# Patient Record
Sex: MALE | Race: BLACK OR AFRICAN AMERICAN | NOT HISPANIC OR LATINO | Employment: OTHER | URBAN - METROPOLITAN AREA
[De-identification: names, ages, dates, MRNs, and addresses within clinical notes are randomized per-mention and may not be internally consistent; named-entity substitution may affect disease eponyms.]

---

## 2024-05-16 ENCOUNTER — APPOINTMENT (OUTPATIENT)
Dept: RADIOLOGY | Facility: HOSPITAL | Age: 74
End: 2024-05-16
Payer: MEDICARE

## 2024-05-16 ENCOUNTER — HOSPITAL ENCOUNTER (OUTPATIENT)
Facility: HOSPITAL | Age: 74
Setting detail: OBSERVATION
Discharge: HOME | End: 2024-05-17
Attending: EMERGENCY MEDICINE | Admitting: INTERNAL MEDICINE
Payer: MEDICARE

## 2024-05-16 ENCOUNTER — APPOINTMENT (OUTPATIENT)
Dept: CARDIOLOGY | Facility: HOSPITAL | Age: 74
End: 2024-05-16
Payer: MEDICARE

## 2024-05-16 DIAGNOSIS — R55 SYNCOPE AND COLLAPSE: Primary | ICD-10-CM

## 2024-05-16 DIAGNOSIS — R55 SYNCOPE, UNSPECIFIED SYNCOPE TYPE: ICD-10-CM

## 2024-05-16 DIAGNOSIS — I10 HYPERTENSION, UNSPECIFIED TYPE: ICD-10-CM

## 2024-05-16 LAB
ALBUMIN SERPL-MCNC: 3.5 G/DL (ref 3.5–5)
ALP BLD-CCNC: 79 U/L (ref 35–125)
ALT SERPL-CCNC: 18 U/L (ref 5–40)
AMPHETAMINES UR QL SCN>1000 NG/ML: NEGATIVE
ANION GAP SERPL CALC-SCNC: 10 MMOL/L
APPEARANCE UR: CLEAR
AST SERPL-CCNC: 27 U/L (ref 5–40)
ATRIAL RATE: 71 BPM
BARBITURATES UR QL SCN>300 NG/ML: NEGATIVE
BASOPHILS # BLD AUTO: 0.03 X10*3/UL (ref 0–0.1)
BASOPHILS NFR BLD AUTO: 0.8 %
BENZODIAZ UR QL SCN>300 NG/ML: NEGATIVE
BILIRUB SERPL-MCNC: 0.3 MG/DL (ref 0.1–1.2)
BILIRUB UR STRIP.AUTO-MCNC: NEGATIVE MG/DL
BUN SERPL-MCNC: 11 MG/DL (ref 8–25)
BZE UR QL SCN>300 NG/ML: NEGATIVE
CALCIUM SERPL-MCNC: 9.2 MG/DL (ref 8.5–10.4)
CANNABINOIDS UR QL SCN>50 NG/ML: POSITIVE
CHLORIDE SERPL-SCNC: 100 MMOL/L (ref 97–107)
CO2 SERPL-SCNC: 27 MMOL/L (ref 24–31)
COLOR UR: COLORLESS
CREAT SERPL-MCNC: 0.9 MG/DL (ref 0.4–1.6)
EGFRCR SERPLBLD CKD-EPI 2021: 90 ML/MIN/1.73M*2
EOSINOPHIL # BLD AUTO: 0.08 X10*3/UL (ref 0–0.4)
EOSINOPHIL NFR BLD AUTO: 2.1 %
ERYTHROCYTE [DISTWIDTH] IN BLOOD BY AUTOMATED COUNT: 15.8 % (ref 11.5–14.5)
ETHANOL SERPL-MCNC: 0.09 G/DL
FENTANYL+NORFENTANYL UR QL SCN: NEGATIVE
GLUCOSE SERPL-MCNC: 139 MG/DL (ref 65–99)
GLUCOSE UR STRIP.AUTO-MCNC: NORMAL MG/DL
HCT VFR BLD AUTO: 36.7 % (ref 41–52)
HGB BLD-MCNC: 11.6 G/DL (ref 13.5–17.5)
HOLD SPECIMEN: NORMAL
IMM GRANULOCYTES # BLD AUTO: 0.04 X10*3/UL (ref 0–0.5)
IMM GRANULOCYTES NFR BLD AUTO: 1 % (ref 0–0.9)
KETONES UR STRIP.AUTO-MCNC: NEGATIVE MG/DL
LACTATE BLDV-SCNC: 2.1 MMOL/L (ref 0.4–2)
LACTATE BLDV-SCNC: 2.2 MMOL/L (ref 0.4–2)
LEUKOCYTE ESTERASE UR QL STRIP.AUTO: NEGATIVE
LYMPHOCYTES # BLD AUTO: 1.04 X10*3/UL (ref 0.8–3)
LYMPHOCYTES NFR BLD AUTO: 26.7 %
MCH RBC QN AUTO: 30.9 PG (ref 26–34)
MCHC RBC AUTO-ENTMCNC: 31.6 G/DL (ref 32–36)
MCV RBC AUTO: 98 FL (ref 80–100)
METHADONE UR QL SCN>300 NG/ML: NEGATIVE
MONOCYTES # BLD AUTO: 0.42 X10*3/UL (ref 0.05–0.8)
MONOCYTES NFR BLD AUTO: 10.8 %
NEUTROPHILS # BLD AUTO: 2.28 X10*3/UL (ref 1.6–5.5)
NEUTROPHILS NFR BLD AUTO: 58.6 %
NITRITE UR QL STRIP.AUTO: NEGATIVE
NRBC BLD-RTO: 0 /100 WBCS (ref 0–0)
NT-PROBNP SERPL-MCNC: 160 PG/ML (ref 0–229)
OPIATES UR QL SCN>300 NG/ML: NEGATIVE
OXYCODONE UR QL: NEGATIVE
P AXIS: 55 DEGREES
P OFFSET: 158 MS
P ONSET: 88 MS
PCP UR QL SCN>25 NG/ML: NEGATIVE
PH UR STRIP.AUTO: 6.5 [PH]
PLATELET # BLD AUTO: 202 X10*3/UL (ref 150–450)
POTASSIUM SERPL-SCNC: 3.4 MMOL/L (ref 3.4–5.1)
PR INTERVAL: 276 MS
PROT SERPL-MCNC: 6.5 G/DL (ref 5.9–7.9)
PROT UR STRIP.AUTO-MCNC: NEGATIVE MG/DL
Q ONSET: 226 MS
QRS COUNT: 11 BEATS
QRS DURATION: 154 MS
QT INTERVAL: 432 MS
QTC CALCULATION(BAZETT): 469 MS
QTC FREDERICIA: 457 MS
R AXIS: 265 DEGREES
RBC # BLD AUTO: 3.75 X10*6/UL (ref 4.5–5.9)
RBC # UR STRIP.AUTO: NEGATIVE /UL
SODIUM SERPL-SCNC: 137 MMOL/L (ref 133–145)
SP GR UR STRIP.AUTO: 1
T AXIS: 46 DEGREES
T OFFSET: 442 MS
TROPONIN T SERPL-MCNC: 26 NG/L
TROPONIN T SERPL-MCNC: 30 NG/L
UROBILINOGEN UR STRIP.AUTO-MCNC: NORMAL MG/DL
VENTRICULAR RATE: 71 BPM
WBC # BLD AUTO: 3.9 X10*3/UL (ref 4.4–11.3)

## 2024-05-16 PROCEDURE — 71045 X-RAY EXAM CHEST 1 VIEW: CPT | Performed by: RADIOLOGY

## 2024-05-16 PROCEDURE — 70450 CT HEAD/BRAIN W/O DYE: CPT

## 2024-05-16 PROCEDURE — 93005 ELECTROCARDIOGRAM TRACING: CPT

## 2024-05-16 PROCEDURE — 72125 CT NECK SPINE W/O DYE: CPT | Performed by: RADIOLOGY

## 2024-05-16 PROCEDURE — G0378 HOSPITAL OBSERVATION PER HR: HCPCS

## 2024-05-16 PROCEDURE — 80307 DRUG TEST PRSMV CHEM ANLYZR: CPT | Performed by: EMERGENCY MEDICINE

## 2024-05-16 PROCEDURE — 71045 X-RAY EXAM CHEST 1 VIEW: CPT

## 2024-05-16 PROCEDURE — 82077 ASSAY SPEC XCP UR&BREATH IA: CPT | Performed by: EMERGENCY MEDICINE

## 2024-05-16 PROCEDURE — 2500000004 HC RX 250 GENERAL PHARMACY W/ HCPCS (ALT 636 FOR OP/ED): Performed by: EMERGENCY MEDICINE

## 2024-05-16 PROCEDURE — 2500000001 HC RX 250 WO HCPCS SELF ADMINISTERED DRUGS (ALT 637 FOR MEDICARE OP): Performed by: EMERGENCY MEDICINE

## 2024-05-16 PROCEDURE — 99223 1ST HOSP IP/OBS HIGH 75: CPT

## 2024-05-16 PROCEDURE — 84484 ASSAY OF TROPONIN QUANT: CPT | Performed by: EMERGENCY MEDICINE

## 2024-05-16 PROCEDURE — 72125 CT NECK SPINE W/O DYE: CPT

## 2024-05-16 PROCEDURE — 83605 ASSAY OF LACTIC ACID: CPT | Mod: 91 | Performed by: EMERGENCY MEDICINE

## 2024-05-16 PROCEDURE — 81003 URINALYSIS AUTO W/O SCOPE: CPT | Mod: 59 | Performed by: EMERGENCY MEDICINE

## 2024-05-16 PROCEDURE — 70450 CT HEAD/BRAIN W/O DYE: CPT | Performed by: RADIOLOGY

## 2024-05-16 PROCEDURE — 80053 COMPREHEN METABOLIC PANEL: CPT | Performed by: EMERGENCY MEDICINE

## 2024-05-16 PROCEDURE — 96360 HYDRATION IV INFUSION INIT: CPT | Mod: 59

## 2024-05-16 PROCEDURE — 36415 COLL VENOUS BLD VENIPUNCTURE: CPT | Performed by: EMERGENCY MEDICINE

## 2024-05-16 PROCEDURE — 2500000001 HC RX 250 WO HCPCS SELF ADMINISTERED DRUGS (ALT 637 FOR MEDICARE OP)

## 2024-05-16 PROCEDURE — 99285 EMERGENCY DEPT VISIT HI MDM: CPT | Mod: 25

## 2024-05-16 PROCEDURE — 83880 ASSAY OF NATRIURETIC PEPTIDE: CPT | Performed by: EMERGENCY MEDICINE

## 2024-05-16 PROCEDURE — 85025 COMPLETE CBC W/AUTO DIFF WBC: CPT | Performed by: EMERGENCY MEDICINE

## 2024-05-16 RX ORDER — GABAPENTIN 300 MG/1
300 CAPSULE ORAL 2 TIMES DAILY
COMMUNITY
Start: 2024-05-14 | End: 2024-06-13

## 2024-05-16 RX ORDER — ONDANSETRON HYDROCHLORIDE 2 MG/ML
4 INJECTION, SOLUTION INTRAVENOUS EVERY 8 HOURS PRN
Status: DISCONTINUED | OUTPATIENT
Start: 2024-05-16 | End: 2024-05-17 | Stop reason: HOSPADM

## 2024-05-16 RX ORDER — NAPROXEN SODIUM 220 MG/1
81 TABLET, FILM COATED ORAL DAILY
Status: DISCONTINUED | OUTPATIENT
Start: 2024-05-16 | End: 2024-05-17 | Stop reason: HOSPADM

## 2024-05-16 RX ORDER — CARVEDILOL 6.25 MG/1
6.25 TABLET ORAL
COMMUNITY

## 2024-05-16 RX ORDER — ATORVASTATIN CALCIUM 40 MG/1
40 TABLET, FILM COATED ORAL NIGHTLY
COMMUNITY
Start: 2024-05-14

## 2024-05-16 RX ORDER — ACETAMINOPHEN 650 MG/1
650 SUPPOSITORY RECTAL EVERY 4 HOURS PRN
Status: DISCONTINUED | OUTPATIENT
Start: 2024-05-16 | End: 2024-05-17 | Stop reason: HOSPADM

## 2024-05-16 RX ORDER — CALCIUM CARBONATE 500(1250)
1250 TABLET ORAL DAILY
Status: DISCONTINUED | OUTPATIENT
Start: 2024-05-16 | End: 2024-05-17 | Stop reason: HOSPADM

## 2024-05-16 RX ORDER — ONDANSETRON 4 MG/1
4 TABLET, FILM COATED ORAL EVERY 8 HOURS PRN
Status: DISCONTINUED | OUTPATIENT
Start: 2024-05-16 | End: 2024-05-17 | Stop reason: HOSPADM

## 2024-05-16 RX ORDER — MULTIVITAMIN
1 TABLET ORAL DAILY
COMMUNITY

## 2024-05-16 RX ORDER — GABAPENTIN 300 MG/1
300 CAPSULE ORAL 2 TIMES DAILY
Status: DISCONTINUED | OUTPATIENT
Start: 2024-05-16 | End: 2024-05-17 | Stop reason: HOSPADM

## 2024-05-16 RX ORDER — CARVEDILOL 6.25 MG/1
6.25 TABLET ORAL
Status: DISCONTINUED | OUTPATIENT
Start: 2024-05-16 | End: 2024-05-17 | Stop reason: HOSPADM

## 2024-05-16 RX ORDER — LANOLIN ALCOHOL/MO/W.PET/CERES
100 CREAM (GRAM) TOPICAL 3 TIMES DAILY
COMMUNITY
Start: 2024-05-14 | End: 2024-06-13

## 2024-05-16 RX ORDER — BISMUTH SUBSALICYLATE 262 MG
1 TABLET,CHEWABLE ORAL DAILY
Status: DISCONTINUED | OUTPATIENT
Start: 2024-05-16 | End: 2024-05-17 | Stop reason: HOSPADM

## 2024-05-16 RX ORDER — LANOLIN ALCOHOL/MO/W.PET/CERES
100 CREAM (GRAM) TOPICAL
Status: DISCONTINUED | OUTPATIENT
Start: 2024-05-16 | End: 2024-05-17 | Stop reason: HOSPADM

## 2024-05-16 RX ORDER — ACETAMINOPHEN 325 MG/1
650 TABLET ORAL EVERY 4 HOURS PRN
Status: DISCONTINUED | OUTPATIENT
Start: 2024-05-16 | End: 2024-05-17 | Stop reason: HOSPADM

## 2024-05-16 RX ORDER — CHOLECALCIFEROL (VITAMIN D3) 50 MCG
2000 TABLET ORAL DAILY
COMMUNITY
Start: 2022-12-14

## 2024-05-16 RX ORDER — ATORVASTATIN CALCIUM 40 MG/1
40 TABLET, FILM COATED ORAL NIGHTLY
Status: DISCONTINUED | OUTPATIENT
Start: 2024-05-16 | End: 2024-05-17 | Stop reason: HOSPADM

## 2024-05-16 RX ORDER — TAMSULOSIN HYDROCHLORIDE 0.4 MG/1
0.4 CAPSULE ORAL DAILY
COMMUNITY

## 2024-05-16 RX ORDER — POLYETHYLENE GLYCOL 3350 17 G/17G
17 POWDER, FOR SOLUTION ORAL DAILY PRN
Status: DISCONTINUED | OUTPATIENT
Start: 2024-05-16 | End: 2024-05-17 | Stop reason: HOSPADM

## 2024-05-16 RX ORDER — CALCIUM CARBONATE 500(1250)
1 TABLET ORAL DAILY
COMMUNITY
Start: 2022-12-14

## 2024-05-16 RX ORDER — ACETAMINOPHEN 160 MG/5ML
650 SOLUTION ORAL EVERY 4 HOURS PRN
Status: DISCONTINUED | OUTPATIENT
Start: 2024-05-16 | End: 2024-05-17 | Stop reason: HOSPADM

## 2024-05-16 RX ADMIN — CARVEDILOL 6.25 MG: 6.25 TABLET, FILM COATED ORAL at 21:09

## 2024-05-16 RX ADMIN — GABAPENTIN 300 MG: 300 CAPSULE ORAL at 21:09

## 2024-05-16 RX ADMIN — SODIUM CHLORIDE 1000 ML: 9 INJECTION, SOLUTION INTRAVENOUS at 09:30

## 2024-05-16 RX ADMIN — APIXABAN 2.5 MG: 2.5 TABLET, FILM COATED ORAL at 22:31

## 2024-05-16 RX ADMIN — Medication 100 MG: at 21:09

## 2024-05-16 RX ADMIN — MULTIVITAMIN TABLET 1 TABLET: TABLET at 21:09

## 2024-05-16 RX ADMIN — ATORVASTATIN CALCIUM 40 MG: 40 TABLET, FILM COATED ORAL at 21:09

## 2024-05-16 RX ADMIN — CALCIUM 1250 MG: 500 TABLET ORAL at 22:31

## 2024-05-16 SDOH — SOCIAL STABILITY: SOCIAL INSECURITY: ABUSE: ADULT

## 2024-05-16 SDOH — SOCIAL STABILITY: SOCIAL INSECURITY: HAVE YOU HAD THOUGHTS OF HARMING ANYONE ELSE?: NO

## 2024-05-16 SDOH — SOCIAL STABILITY: SOCIAL INSECURITY: DOES ANYONE TRY TO KEEP YOU FROM HAVING/CONTACTING OTHER FRIENDS OR DOING THINGS OUTSIDE YOUR HOME?: NO

## 2024-05-16 SDOH — SOCIAL STABILITY: SOCIAL INSECURITY: HAS ANYONE EVER THREATENED TO HURT YOUR FAMILY OR YOUR PETS?: NO

## 2024-05-16 SDOH — SOCIAL STABILITY: SOCIAL INSECURITY: DO YOU FEEL ANYONE HAS EXPLOITED OR TAKEN ADVANTAGE OF YOU FINANCIALLY OR OF YOUR PERSONAL PROPERTY?: NO

## 2024-05-16 SDOH — SOCIAL STABILITY: SOCIAL INSECURITY: ARE THERE ANY APPARENT SIGNS OF INJURIES/BEHAVIORS THAT COULD BE RELATED TO ABUSE/NEGLECT?: NO

## 2024-05-16 SDOH — SOCIAL STABILITY: SOCIAL INSECURITY: WERE YOU ABLE TO COMPLETE ALL THE BEHAVIORAL HEALTH SCREENINGS?: YES

## 2024-05-16 SDOH — SOCIAL STABILITY: SOCIAL INSECURITY: ARE YOU OR HAVE YOU BEEN THREATENED OR ABUSED PHYSICALLY, EMOTIONALLY, OR SEXUALLY BY ANYONE?: NO

## 2024-05-16 SDOH — SOCIAL STABILITY: SOCIAL INSECURITY: HAVE YOU HAD ANY THOUGHTS OF HARMING ANYONE ELSE?: NO

## 2024-05-16 SDOH — SOCIAL STABILITY: SOCIAL INSECURITY: DO YOU FEEL UNSAFE GOING BACK TO THE PLACE WHERE YOU ARE LIVING?: NO

## 2024-05-16 ASSESSMENT — ACTIVITIES OF DAILY LIVING (ADL)
LACK_OF_TRANSPORTATION: NO
TOILETING: INDEPENDENT
GROOMING: INDEPENDENT
DRESSING YOURSELF: INDEPENDENT
JUDGMENT_ADEQUATE_SAFELY_COMPLETE_DAILY_ACTIVITIES: YES
FEEDING YOURSELF: INDEPENDENT
ADEQUATE_TO_COMPLETE_ADL: YES
BATHING: INDEPENDENT
WALKS IN HOME: INDEPENDENT
ASSISTIVE_DEVICE: DENTURES LOWER;EYEGLASSES
PATIENT'S MEMORY ADEQUATE TO SAFELY COMPLETE DAILY ACTIVITIES?: YES
HEARING - LEFT EAR: FUNCTIONAL
HEARING - RIGHT EAR: FUNCTIONAL

## 2024-05-16 ASSESSMENT — COGNITIVE AND FUNCTIONAL STATUS - GENERAL
MOBILITY SCORE: 24
PATIENT BASELINE BEDBOUND: NO
DAILY ACTIVITIY SCORE: 24

## 2024-05-16 ASSESSMENT — LIFESTYLE VARIABLES
AUDIT-C TOTAL SCORE: 2
HAVE YOU EVER FELT YOU SHOULD CUT DOWN ON YOUR DRINKING: NO
HOW OFTEN DO YOU HAVE A DRINK CONTAINING ALCOHOL: 2-4 TIMES A MONTH
HOW OFTEN DO YOU HAVE 6 OR MORE DRINKS ON ONE OCCASION: NEVER
AUDIT-C TOTAL SCORE: 2
SUBSTANCE_ABUSE_PAST_12_MONTHS: YES
PRESCIPTION_ABUSE_PAST_12_MONTHS: NO
HAVE PEOPLE ANNOYED YOU BY CRITICIZING YOUR DRINKING: NO
HOW MANY STANDARD DRINKS CONTAINING ALCOHOL DO YOU HAVE ON A TYPICAL DAY: 1 OR 2
EVER FELT BAD OR GUILTY ABOUT YOUR DRINKING: NO
SKIP TO QUESTIONS 9-10: 1
EVER HAD A DRINK FIRST THING IN THE MORNING TO STEADY YOUR NERVES TO GET RID OF A HANGOVER: NO
TOTAL SCORE: 0

## 2024-05-16 ASSESSMENT — ENCOUNTER SYMPTOMS
PALPITATIONS: 0
CHILLS: 0
ACTIVITY CHANGE: 1
DIFFICULTY URINATING: 0
LIGHT-HEADEDNESS: 1
DIAPHORESIS: 0
ABDOMINAL DISTENTION: 0
HEADACHES: 1
NECK PAIN: 0
CHEST TIGHTNESS: 0
DIZZINESS: 1
NECK STIFFNESS: 0
NAUSEA: 0
FEVER: 0
SHORTNESS OF BREATH: 0
VOMITING: 0
CONSTIPATION: 0
FATIGUE: 1
DIARRHEA: 0
WEAKNESS: 1

## 2024-05-16 ASSESSMENT — PAIN SCALES - GENERAL
PAINLEVEL_OUTOF10: 0 - NO PAIN

## 2024-05-16 ASSESSMENT — PATIENT HEALTH QUESTIONNAIRE - PHQ9
SUM OF ALL RESPONSES TO PHQ9 QUESTIONS 1 & 2: 0
2. FEELING DOWN, DEPRESSED OR HOPELESS: NOT AT ALL
1. LITTLE INTEREST OR PLEASURE IN DOING THINGS: NOT AT ALL

## 2024-05-16 ASSESSMENT — PAIN DESCRIPTION - PROGRESSION: CLINICAL_PROGRESSION: NOT CHANGED

## 2024-05-16 ASSESSMENT — PAIN - FUNCTIONAL ASSESSMENT: PAIN_FUNCTIONAL_ASSESSMENT: 0-10

## 2024-05-16 ASSESSMENT — PAIN SCALES - WONG BAKER: WONGBAKER_NUMERICALRESPONSE: NO HURT

## 2024-05-16 NOTE — H&P
History Of Present Illness  Ciarra Beckett is a 73 y.o. male past medical history of hypertension, CAD, pulmonary embolism, and alcohol abuse presenting to emergency department for evaluation after he was found unresponsive and leaning down on his electric scooter today at [facility] with his son.  Patient states he does not recall falling or losing consciousness.  He denies hitting his head or any injuries.  He denies chest pain, shortness of breath, palpitations, diaphoresis, and weakness or numbness.  He denies abdominal discomfort, nausea/vomiting/diarrhea.  Wife Luis reported that he had similar symptoms last Sunday and admitted to [facility].  Wife states his cardiac stress test was normal.  She was not sure about echocardiogram.  On physical exam he is well-nourished, well-developed male not in acute distress.  No neurological or vascular deficits on exam.  He is alert and oriented x 4. He is a smoker smokes cigarettes occasionally.  He drinks alcohol.  He is negative for CIWA.    His electronic medical records reviewed and shows that he was admitted at [facility] on May 12, 2024 for syncope.  His cardiac and neurology evaluation were normal.  Cardiology and neurology cleared him for discharge.  His cardiac stress test and echocardiogram was completed and was normal during the visit at [facility] on 5/14/2024.    [facility]    ED course  EKG with sinus rhythm at 71 bpm, first-degree AV block, normal axis, normal voltage, normal ST segment, normal T waves  Repeat EKG with sinus bradycardia at 59 bpm, first-degree block, leftward axis, right bundle branch block pattern, normal ST segment, normal T waves  Diagnostic labs with evidence of substance abuse, borderline lactic acidosis, elevated troponin T, evidence of recent alcohol ingestion, leukopenia, anemia, but otherwise unremarkable.   High-sensitivity troponin 30; 26  Chest x-ray shows Elevated  right diaphragm. No focal infiltrate.   CT head shows No CT evidence for acute intracranial pathology.   CT cervical spine shows  No acute fracture or spondylolisthesis.   2. Degenerative disc disease and spondylosis as described in the body   of the report.     ED medications  Sodium chloride 0.9% bolus 1000 mL  Cardiac Stress test was completed on 5/13/2024 [facility]   1. SPECT Perfusion Study Normal.  2. There is no scintigraphic evidence for inducible ischemia.  3. No evidence of scarred myocardium.  4. Left ventricle is normal in size. The left ventricle systolic  function is normal.  5. Right ventricle is normal in size. The right ventricle systolic  function is normal.  6. This is a low risk scan.  Gated Stress FBP  LVEF % 53  Transthoracic echo was completed on 05/14/2024 at [facility]   Ejection fraction 60%    Past Medical History  Past Medical History:   Diagnosis Date    H/O ETOH abuse     Hyperlipidemia     Hypertension     Pulmonary embolism (Multi)        Surgical History  Past Surgical History:   Procedure Laterality Date    HIP FRACTURE SURGERY      2009        Social History  He reports that he has been smoking cigarettes. He does not have any smokeless tobacco history on file. He reports current alcohol use of about 1.0 standard drink of alcohol per week. He reports current drug use.    Family History  No family history on file.     Allergies  Lisinopril    Review of Systems   Constitutional:  Positive for activity change and fatigue. Negative for chills, diaphoresis and fever.   HENT:  Negative for congestion.    Eyes:  Negative for visual disturbance.   Respiratory:  Negative for chest tightness and shortness of breath.    Cardiovascular:  Negative for chest pain and palpitations.   Gastrointestinal:  Negative for abdominal distention, constipation, diarrhea, nausea and vomiting.   Genitourinary:  Negative for difficulty urinating.   Musculoskeletal:  Negative for neck pain  "and neck stiffness.   Neurological:  Positive for dizziness, syncope, weakness, light-headedness and headaches.   All other systems reviewed and are negative.    Physical Exam  Constitutional:       General: He is not in acute distress.     Appearance: Normal appearance. He is not diaphoretic.   HENT:      Head: Normocephalic and atraumatic.      Nose: Nose normal.      Mouth/Throat:      Mouth: Mucous membranes are dry.   Eyes:      Pupils: Pupils are equal, round, and reactive to light.   Cardiovascular:      Rate and Rhythm: Normal rate and regular rhythm.      Pulses: Normal pulses.      Heart sounds: Normal heart sounds. No murmur heard.     No gallop.   Pulmonary:      Effort: Pulmonary effort is normal. No respiratory distress.      Breath sounds: Normal breath sounds. No wheezing or rales.   Chest:      Chest wall: No tenderness.   Abdominal:      General: Bowel sounds are normal. There is no distension.      Palpations: Abdomen is soft.      Tenderness: There is no abdominal tenderness.   Genitourinary:     Comments: Deferred  Musculoskeletal:         General: No tenderness.      Cervical back: Normal range of motion and neck supple. No rigidity or tenderness.      Right lower le+ Pitting Edema present.      Left lower le+ Pitting Edema present.   Skin:     General: Skin is warm and dry.      Capillary Refill: Capillary refill takes less than 2 seconds.      Findings: No bruising, erythema, lesion or rash.   Neurological:      General: No focal deficit present.      Mental Status: He is alert and oriented to person, place, and time.   Psychiatric:         Mood and Affect: Mood normal.         Behavior: Behavior normal.       Last Recorded Vitals  Blood pressure (!) 171/96, pulse 58, temperature 36.8 °C (98.2 °F), resp. rate 18, height 1.803 m (5' 11\"), weight 86.2 kg (190 lb), SpO2 100%.    Relevant Results      Results for orders placed or performed during the hospital encounter of 24 (from " the past 24 hour(s))   ECG 12 lead   Result Value Ref Range    Ventricular Rate 71 BPM    Atrial Rate 71 BPM    WA Interval 276 ms    QRS Duration 154 ms    QT Interval 432 ms    QTC Calculation(Bazett) 469 ms    P Axis 55 degrees    R Axis 265 degrees    T Axis 46 degrees    QRS Count 11 beats    Q Onset 226 ms    P Onset 88 ms    P Offset 158 ms    T Offset 442 ms    QTC Fredericia 457 ms   NT Pro-BNP   Result Value Ref Range    PROBNP 160 0 - 229 pg/mL   CBC and Auto Differential   Result Value Ref Range    WBC 3.9 (L) 4.4 - 11.3 x10*3/uL    nRBC 0.0 0.0 - 0.0 /100 WBCs    RBC 3.75 (L) 4.50 - 5.90 x10*6/uL    Hemoglobin 11.6 (L) 13.5 - 17.5 g/dL    Hematocrit 36.7 (L) 41.0 - 52.0 %    MCV 98 80 - 100 fL    MCH 30.9 26.0 - 34.0 pg    MCHC 31.6 (L) 32.0 - 36.0 g/dL    RDW 15.8 (H) 11.5 - 14.5 %    Platelets 202 150 - 450 x10*3/uL    Neutrophils % 58.6 40.0 - 80.0 %    Immature Granulocytes %, Automated 1.0 (H) 0.0 - 0.9 %    Lymphocytes % 26.7 13.0 - 44.0 %    Monocytes % 10.8 2.0 - 10.0 %    Eosinophils % 2.1 0.0 - 6.0 %    Basophils % 0.8 0.0 - 2.0 %    Neutrophils Absolute 2.28 1.60 - 5.50 x10*3/uL    Immature Granulocytes Absolute, Automated 0.04 0.00 - 0.50 x10*3/uL    Lymphocytes Absolute 1.04 0.80 - 3.00 x10*3/uL    Monocytes Absolute 0.42 0.05 - 0.80 x10*3/uL    Eosinophils Absolute 0.08 0.00 - 0.40 x10*3/uL    Basophils Absolute 0.03 0.00 - 0.10 x10*3/uL   Comprehensive metabolic panel   Result Value Ref Range    Glucose 139 (H) 65 - 99 mg/dL    Sodium 137 133 - 145 mmol/L    Potassium 3.4 3.4 - 5.1 mmol/L    Chloride 100 97 - 107 mmol/L    Bicarbonate 27 24 - 31 mmol/L    Urea Nitrogen 11 8 - 25 mg/dL    Creatinine 0.90 0.40 - 1.60 mg/dL    eGFR 90 >60 mL/min/1.73m*2    Calcium 9.2 8.5 - 10.4 mg/dL    Albumin 3.5 3.5 - 5.0 g/dL    Alkaline Phosphatase 79 35 - 125 U/L    Total Protein 6.5 5.9 - 7.9 g/dL    AST 27 5 - 40 U/L    Bilirubin, Total 0.3 0.1 - 1.2 mg/dL    ALT 18 5 - 40 U/L    Anion Gap 10 <=19  mmol/L   BLOOD GAS LACTIC ACID, VENOUS   Result Value Ref Range    POCT Lactate, Venous 2.2 (H) 0.4 - 2.0 mmol/L   Serial Troponin, Initial ([facility])   Result Value Ref Range    Troponin T, High Sensitivity 30 (HH) <=14 ng/L   Ethanol   Result Value Ref Range    Alcohol 0.092 (H) 0.000 - 0.010 g/dL   Urinalysis with Reflex Culture and Microscopic   Result Value Ref Range    Color, Urine Colorless (N) Light-Yellow, Yellow, Dark-Yellow    Appearance, Urine Clear Clear    Specific Gravity, Urine 1.005 1.005 - 1.035    pH, Urine 6.5 5.0, 5.5, 6.0, 6.5, 7.0, 7.5, 8.0    Protein, Urine NEGATIVE NEGATIVE, 10 (TRACE), 20 (TRACE) mg/dL    Glucose, Urine Normal Normal mg/dL    Blood, Urine NEGATIVE NEGATIVE    Ketones, Urine NEGATIVE NEGATIVE mg/dL    Bilirubin, Urine NEGATIVE NEGATIVE    Urobilinogen, Urine Normal Normal mg/dL    Nitrite, Urine NEGATIVE NEGATIVE    Leukocyte Esterase, Urine NEGATIVE NEGATIVE   Drug Screen, Urine   Result Value Ref Range    Amphetamine Screen, Urine Negative      Barbiturate Screen, Urine Negative      Benzodiazepines Screen, Urine Negative      Cannabinoid Screen, Urine Positive (A)      Cocaine Metabolite Screen, Urine Negative      Fentanyl Screen, Urine Negative       Methadone Screen, Urine Negative      Opiate Screen, Urine Negative      Oxycodone Screen, Urine Negative      PCP Screen, Urine Negative     Blood Gas Lactic Acid, Venous   Result Value Ref Range    POCT Lactate, Venous 2.1 (H) 0.4 - 2.0 mmol/L   Serial Troponin, 2 Hour ([facility])   Result Value Ref Range    Troponin T, High Sensitivity 26 (HH) <=14 ng/L   ECG 12 lead   Result Value Ref Range    Ventricular Rate 59 BPM    Atrial Rate 59 BPM    MA Interval 304 ms    QRS Duration 142 ms    QT Interval 462 ms    QTC Calculation(Bazett) 457 ms    P Axis 53 degrees    R Axis -54 degrees    T Axis 37 degrees    QRS Count 10 beats    Q Onset 225 ms    T Offset 456 ms    QTC Fredericia 459 ms     Assessment/Plan   Principal Problem:     Syncope, unspecified syncope type    Syncope and collapse  Hs Tn trending down 30; 26  Echocardiogram in am  Monitor on telemetry overnight  EKG as needed for ACS  Orthostatic vitals every shift  Zio patch x 14 days upon discharge    Elevated troponin's  Hs Tn 30; 26; pending third one  Monitor on telemetry  EKG as needed    Hypertension  Vitals every 4 hours  Continue home medications  Including carvedilol    Hyperlipidemia  Continue home medications  Including statins    CAD  ASA/statins    DVT prophylaxis  History of PE  Continue home medications  Including Eliquis    Alcohol abuse  Monitor on telemetry   lactic acidosis   Alcohol 0.092  Follow DT precautions  Monitor for alcohol withdrawal  Continue thiamine 100 mg 3 times daily    Overall impression/plan  Admit to CDU observation overnight. Monitor on telemetry. High-sensitivity troponins trending down. Repeat Labs in the morning.  Orthostatic vitals every shift.  Monitor for alcohol withdrawal. EKG with sinus bradycardia at 59 bpm, first-degree block, leftward axis, right bundle branch block pattern, normal ST segment, normal T waves.  EKG as needed for ACS. Chest x-ray shows Elevated right diaphragm. No focal infiltrate. CT head shows No CT evidence for acute intracranial pathology.  CT cervical spine shows no acute fracture or spondylolisthesis. NPO after midnight for any test warranted in am for risk stratification. Zio patch for 2 weeks upon discharge.      I spent 60 minutes in the professional and overall care of this patient.    Ash Schmitz, APRN-CNP

## 2024-05-16 NOTE — PROGRESS NOTES
Attestation/Supervisory note for SRINATH Parra      The patient is a 73-year-old male presenting to the emergency department for evaluation after he was found on or near a chair unresponsive at a local [employer] store just prior to arrival.  The patient states he does not recall falling or losing consciousness.  He states that the same thing happened to him recently.  He states he was just discharged from [facility] after being seen for the same symptoms this morning.  He denies the use of any blood thinners.  He denies any headache or visual changes.  No chest pain or shortness of breath.  No palpitations.  No diaphoresis.  No focal weakness or numbness.  No abdominal pain.  No nausea or vomiting.  No diarrhea or constipation no urinary complaints.  He states that he does not believe that he injured himself when he passed out.  All pertinent positives and negatives are recorded above.  All other systems reviewed and otherwise negative.  Vital signs with borderline hypotension but otherwise within normal limits.  Physical exam with a well-nourished well-developed male in no acute distress.  HEENT exam within normal limits.  He has no evidence of airway compromise or respiratory distress.  Abdominal exam is benign.  He has no gross motor, neurologic or vascular deficits on exam.  NIH stroke scale score of 0.  No visible or palpable bony deformities on exam.      Review of his electronic medical record shows that he was seen at [facility] on 12 May 2024 for syncope.  He was admitted and had cardiac and neurology evaluation that were within normal limits.  He did reportedly have a high alcohol level when he initially presented to the emergency room.  He was discharged from the hospital on 14 May 2024.      EKG with sinus rhythm at 71 bpm, first-degree AV block, normal axis, normal voltage, normal ST segment, normal T waves      Repeat EKG with sinus bradycardia at 59 bpm, first-degree block, leftward axis,  right bundle branch block pattern, normal ST segment, normal T waves      IV fluids ordered.      Diagnostic labs with evidence of substance abuse, borderline lactic acidosis, elevated troponin T, evidence of recent alcohol ingestion, leukopenia, anemia, but otherwise unremarkable.      Initial Troponin T 30. Repeat trop T 26      CT head wo IV contrast   Final Result   No CT evidence for acute intracranial pathology.        Signed by: Zain Alvarado 5/16/2024 12:32 PM   Dictation workstation:   [id number]      CT cervical spine wo IV contrast   Final Result   1. No acute fracture or spondylolisthesis.   2. Degenerative disc disease and spondylosis as described in the body   of the report.             Signed by: Zain Alvarado 5/16/2024 12:40 PM   Dictation workstation:   [id number]      XR chest 1 view   Final Result   Elevated right diaphragm. No focal infiltrate.        MACRO:   None.        Signed by: Kayce Sanabria 5/16/2024 10:32 AM   Dictation workstation:   [id number]           The patient does not have any evidence of ischemia on EKG.  He does have a elevated troponin T that is relatively flat.  No events on telemetry.  Chest x-ray without acute process.  No evidence widening of the mediastinum.  No evidence of pneumothorax.  No evidence of pneumonia or CHF.  The CT head without evidence of fracture or intracranial hemorrhage.  No mass effect.  CT C-spine without evidence of fracture or dislocation.      The patient was admitted to cardiac observation unit for trending of his cardiac enzymes and further management.      Impression/diagnoses  Syncope  Fall from standing height, initial encounter  Closed head injury  Anemia, unspecified      Critical care time billing to not warranted at this time      I personally saw the patient and made/approve the management plan and take responsibility for the patient management.      I independently interpreted the following study (S): EKG and diagnostic  labs      I personally discussed the patient's management with the patient      I reviewed the results of the diagnostic labs and diagnostic imaging.  Formal radiology read was completed by the radiologist.      MD Kathe Cuevas MD

## 2024-05-16 NOTE — PROGRESS NOTES
Pharmacy Medication History Review    Ciarra Beckett is a 73 y.o. male admitted for Syncope, unspecified syncope type. Pharmacy reviewed the patient's [id number] medications and allergies for accuracy.    Medications ADDED:  Apixaban 5 mg  Atorvastatin 40 mg  Calcium carbonate  Cholecalciferol  Gabapentin 300  mg  Multivitamin  Thiamine 100 mg  Medications CHANGED:  none  Medications REMOVED:   none     The list below reflects the updated PTA list. Comments regarding how patient may be taking medications differently can be found in the Admit Orders Activity  Prior to Admission Medications   Prescriptions Last Dose Informant Patient Reported? Taking?   apixaban (Eliquis) 5 mg tablet  Other, Spouse/Significant Other Yes Yes   Sig: Take 1 tablet (5 mg) by mouth twice a day.   atorvastatin (Lipitor) 40 mg tablet  Other Yes Yes   Sig: Take 1 tablet (40 mg) by mouth once daily at bedtime.   calcium carbonate (Oscal) 500 mg calcium (1,250 mg) tablet  Spouse/Significant Other, Other Yes Yes   Si tablet (1,250 mg) once daily.   cholecalciferol (Vitamin D-3) 50 MCG (2000 UT) tablet  Spouse/Significant Other, Other Yes Yes   Sig: Take 1 tablet (2,000 Units) by mouth once daily.   gabapentin (Neurontin) 300 mg capsule  Spouse/Significant Other, Other Yes Yes   Sig: Take 1 capsule (300 mg) by mouth 2 times a day.   multivitamin tablet  Spouse/Significant Other, Other Yes No   Sig: Take 1 tablet by mouth once daily.   thiamine 100 mg tablet  Spouse/Significant Other, Other Yes Yes   Sig: Take 1 tablet (100 mg) by mouth 3 times a day.      Facility-Administered Medications: None        The list below reflects the updated allergy list. Please review each documented allergy for additional clarification and justification.  Allergies  Reviewed by COLIN Velázquez on 2024        Severity Reactions Comments    Lisinopril Medium Angioedema             Pharmacy has been updated to [facility].    Sources  used to complete the med history include spouse interview, daughter, Care Everywhere; Family knows some meds but not doses.    Below are additional concerns with the patient's PTA list.  [facility]?    Mary Pringle, PharmD  Please reach out via [employer] Secure Chat for questions

## 2024-05-16 NOTE — ED PROVIDER NOTES
HPI   Chief Complaint   Patient presents with    Syncope    Hypotension       Patient is a 73-year-old male with past medical history of A-fib on Eliquis presenting via EMS after being found unresponsive.  Per EMS, patient was sitting in [employer] in a chair and then was called due to the patient appearing to be unconscious.  Per EMS, patient was awake.  Story per patient is that he did take a rest while in [employer] with his son.  Patient states that he recalls having been admitted several days ago at [facility] for hypotension.  Per EMS, patient's blood pressure was hypotensive as well.  During [facility] admission, he was evaluated by cardiology and neurology without any acute findings.  Patient denies fevers, chills, cough, sore throat, runny nose, chest pain, shortness of breath, abdominal pain, nausea, vomiting, diarrhea or urinary complaints.  Patient denies a history of frequent alcohol use, illicit drug use, tobacco use.                          No data recorded                   Patient History   No past medical history on file.  No past surgical history on file.  No family history on file.  Social History     Tobacco Use    Smoking status: Not on file    Smokeless tobacco: Not on file   Substance Use Topics    Alcohol use: Not on file    Drug use: Not on file       Physical Exam   ED Triage Vitals   Temperature Heart Rate Respirations BP   05/16/24 0923 05/16/24 0923 05/16/24 0923 05/16/24 0925   36.8 °C (98.2 °F) 69 18 98/63      Pulse Ox Temp src Heart Rate Source Patient Position   05/16/24 0923 -- -- --   96 %         BP Location FiO2 (%)     -- --             Physical Exam  Constitutional:       Comments: Awake, tired appearing, laying in Trendelenburg   HENT:      Head: Normocephalic and atraumatic.      Nose: Nose normal.      Mouth/Throat:      Mouth: Mucous membranes are moist.      Pharynx: Oropharynx is clear.   Eyes:      Extraocular Movements: Extraocular movements intact.       Pupils: Pupils are equal, round, and reactive to light.   Cardiovascular:      Rate and Rhythm: Normal rate and regular rhythm.      Pulses: Normal pulses.      Heart sounds: Normal heart sounds.   Pulmonary:      Effort: Pulmonary effort is normal.      Breath sounds: Normal breath sounds.   Abdominal:      General: Abdomen is flat.      Palpations: Abdomen is soft.   Musculoskeletal:         General: Normal range of motion.      Cervical back: Normal range of motion and neck supple.   Skin:     General: Skin is warm and dry.      Capillary Refill: Capillary refill takes less than 2 seconds.   Neurological:      General: No focal deficit present.      Mental Status: He is oriented to person, place, and time.   Psychiatric:         Mood and Affect: Mood normal.         Behavior: Behavior normal.         ED Course & MDM   Diagnoses as of 05/16/24 1511   Syncope and collapse   Hypertension, unspecified type       Medical Decision Making  Patient is a 73-year-old male with past medical Struve A-fib on Eliquis presenting via EMS after being found unresponsive.  CBC, CMP, BNP, lactate, ethanol, UA, urine drug screen ordered.  CT C-spine, CT head, chest x-ray ordered.  Conditions considered include but not limited to: Syncope, ACS, alcohol/drug intoxication.  Patient has an NIH stroke scale of 1 due to slight arousal required to awake this patient.  Upon initial presentation, patient was sitting with his eyes closed and did awake when I announced my presence.    I saw this patient conjunction with Dr. Ojeda.  Per prior note review, it appears patient had similar event and was admitted to [facility].  That time, he did have an elevated alcohol level.  CBC does show leukopenia with WBCs at 3.6 as well as signs of anemia with hemoglobin at 11.6.  There are no labs to compare to prior.  Initial troponin is 30 with repeat at 26.  Alcohol is 0.092.  Lactate is elevated at 2.1.  IV fluids were ordered.  UA with micro is without  infection.  Urine drug screen is positive for cannabinoids.  CT of this without acute intracranial findings.  CT C-spine is without acute findings.  Chest x-ray shows an elevated right diaphragm without local infiltrates.    Attending physician spoke with the observation unit.  They state that due to the patient's resolving hypotension with IV fluids and elevated troponins, they would accept this patient.  The patient will be admitted under Dr. Vitale's services in the observation unit.  As I deemed necessary from the patient's history, physical, laboratory and imaging findings as well as ED course, I considered the above listed diagnoses.    Upon my evaluation, this patient had a high probability of imminent or life threatening deterioration due to hypotension, which required my direct attention, intervention, and personal management.  Patient required IV fluids    I personally provided 32 minutes to nonconcurrent critical care time exclusive of time spent on separately billable procedures.  Time includes review of laboratory data, radiology results, discussion with consultants, and monitoring for potential decompensation.  Interventions were performed as documented above.    Portions of this note made with Dragon software, please be mindful of potential grammatical errors.        Medications   sodium chloride 0.9 % bolus 1,000 mL (0 mL intravenous Stopped 5/16/24 1053)         Labs Reviewed   CBC WITH AUTO DIFFERENTIAL - Abnormal       Result Value    WBC 3.9 (*)     nRBC 0.0      RBC 3.75 (*)     Hemoglobin 11.6 (*)     Hematocrit 36.7 (*)     MCV 98      MCH 30.9      MCHC 31.6 (*)     RDW 15.8 (*)     Platelets 202      Neutrophils % 58.6      Immature Granulocytes %, Automated 1.0 (*)     Lymphocytes % 26.7      Monocytes % 10.8      Eosinophils % 2.1      Basophils % 0.8      Neutrophils Absolute 2.28      Immature Granulocytes Absolute, Automated 0.04      Lymphocytes Absolute 1.04      Monocytes Absolute  0.42      Eosinophils Absolute 0.08      Basophils Absolute 0.03     COMPREHENSIVE METABOLIC PANEL - Abnormal    Glucose 139 (*)     Sodium 137      Potassium 3.4      Chloride 100      Bicarbonate 27      Urea Nitrogen 11      Creatinine 0.90      eGFR 90      Calcium 9.2      Albumin 3.5      Alkaline Phosphatase 79      Total Protein 6.5      AST 27      Bilirubin, Total 0.3      ALT 18      Anion Gap 10     BLOOD GAS LACTIC ACID, VENOUS - Abnormal    POCT Lactate, Venous 2.2 (*)    SERIAL TROPONIN, INITIAL ([facility]) - Abnormal    Troponin T, High Sensitivity 30 (*)    URINALYSIS WITH REFLEX CULTURE AND MICROSCOPIC - Abnormal    Color, Urine Colorless (*)     Appearance, Urine Clear      Specific Gravity, Urine 1.005      pH, Urine 6.5      Protein, Urine NEGATIVE      Glucose, Urine Normal      Blood, Urine NEGATIVE      Ketones, Urine NEGATIVE      Bilirubin, Urine NEGATIVE      Urobilinogen, Urine Normal      Nitrite, Urine NEGATIVE      Leukocyte Esterase, Urine NEGATIVE     DRUG SCREEN,URINE - Abnormal    Amphetamine Screen, Urine Negative      Barbiturate Screen, Urine Negative      Benzodiazepines Screen, Urine Negative      Cannabinoid Screen, Urine Positive (*)     Cocaine Metabolite Screen, Urine Negative      Fentanyl Screen, Urine Negative      Methadone Screen, Urine Negative      Opiate Screen, Urine Negative      Oxycodone Screen, Urine Negative      PCP Screen, Urine Negative      Narrative:     These toxicological screening tests provide unconfirmed qualitative measurements to aid in treatment and diagnosis in cases of drug use or overdose. This test is used only for medical purposes. A positive result does not indicate or measure intoxication. For specific test performance or pathologist consultation, please contact the Laboratory.    The following threshold concentrations are used for these analyses.Values at or above the threshold concentration are reported as positive. Values below the threshold  are reported as negative.    Drug /Screening Threshold                                                                                                 THC/CANNABINOIDS................50 ng/ml  METHADONE......................300 ng/ml  COCAINE METABOLITES............300 ng/ml  BENZODIAZEPINE.................300 ng/ml  PCP.............................25 ng/ml  OPIATE.........................300 ng/ml  AMPHETAMINE/ECSTASY...........1000 ng/ml  BARBITURATE....................200 ng/ml  OXYCODONE......................100 ng/ml  FENTANYL.........................5 ng/ml       ALCOHOL - Abnormal    Alcohol 0.092 (*)    SERIAL TROPONIN,  2 HOUR ([facility]) - Abnormal    Troponin T, High Sensitivity 26 (*)    BLOOD GAS LACTIC ACID, VENOUS - Abnormal    POCT Lactate, Venous 2.1 (*)    N-TERMINAL PROBNP - Normal    PROBNP 160      Narrative:     Reference ranges are based on clinical submission data. These ranges represent the 95th percentile of normal cut-off points. As NT Pro- BNP values approach 1000 pg/ml, clinical symptoms are more likely associated with CHF.   TROPONIN T SERIES, HIGH SENSITIVITY (0, 2 HR, 6 HR)    Narrative:     The following orders were created for panel order Troponin T Series, High Sensitivity (0, 2HR, 6HR).  Procedure                               Abnormality         Status                     ---------                               -----------         ------                     Serial Troponin, Initial...[[id number]]  Abnormal            Final result               Serial Troponin, 2 Hour ...[[id number]]  Abnormal            Final result               Serial Troponin, 6 Hour ...[[id number]]                                                   Please view results for these tests on the individual orders.   URINALYSIS WITH REFLEX CULTURE AND MICROSCOPIC    Narrative:     The following orders were created for panel order Urinalysis with Reflex Culture and Microscopic.  Procedure                                Abnormality         Status                     ---------                               -----------         ------                     Urinalysis with Reflex C...[[id number]]  Abnormal            Final result               Extra Urine Gray Tube[[id number]]                            In process                   Please view results for these tests on the individual orders.   EXTRA URINE GRAY TUBE   SERIAL TROPONIN, 6 HOUR ([facility])         CT head wo IV contrast   Final Result   No CT evidence for acute intracranial pathology.        Signed by: Zain Alvarado 5/16/2024 12:32 PM   Dictation workstation:   [id number]      CT cervical spine wo IV contrast   Final Result   1. No acute fracture or spondylolisthesis.   2. Degenerative disc disease and spondylosis as described in the body   of the report.             Signed by: Zain Alvarado 5/16/2024 12:40 PM   Dictation workstation:   [id number]      XR chest 1 view   Final Result   Elevated right diaphragm. No focal infiltrate.        MACRO:   None.        Signed by: Kayce Sanabria 5/16/2024 10:32 AM   Dictation workstation:   [id number]            Procedure  Procedures     Farrukh Parra PA-C  05/16/24 1513

## 2024-05-16 NOTE — ED TRIAGE NOTES
Patient presents to the emergency department with a syncopal episode. Patient was found in [employer] passed out in a chair. Upon EMS arrival, patient blood pressure was 60's/40's. Patient was just discharged from [facility] for hypotension. Patient is axo 4. Patient denies dizziness and lightheadedness.

## 2024-05-17 ENCOUNTER — APPOINTMENT (OUTPATIENT)
Dept: CARDIOLOGY | Facility: HOSPITAL | Age: 74
End: 2024-05-17
Payer: MEDICARE

## 2024-05-17 VITALS
SYSTOLIC BLOOD PRESSURE: 111 MMHG | BODY MASS INDEX: 26.6 KG/M2 | WEIGHT: 190 LBS | DIASTOLIC BLOOD PRESSURE: 80 MMHG | HEART RATE: 87 BPM | RESPIRATION RATE: 17 BRPM | HEIGHT: 71 IN | OXYGEN SATURATION: 96 % | TEMPERATURE: 98.1 F

## 2024-05-17 LAB
ANION GAP SERPL CALC-SCNC: 9 MMOL/L
ATRIAL RATE: 59 BPM
BUN SERPL-MCNC: 10 MG/DL (ref 8–25)
CALCIUM SERPL-MCNC: 9.5 MG/DL (ref 8.5–10.4)
CHLORIDE SERPL-SCNC: 101 MMOL/L (ref 97–107)
CO2 SERPL-SCNC: 27 MMOL/L (ref 24–31)
CREAT SERPL-MCNC: 0.7 MG/DL (ref 0.4–1.6)
EGFRCR SERPLBLD CKD-EPI 2021: >90 ML/MIN/1.73M*2
ERYTHROCYTE [DISTWIDTH] IN BLOOD BY AUTOMATED COUNT: 15.7 % (ref 11.5–14.5)
GLUCOSE SERPL-MCNC: 112 MG/DL (ref 65–99)
HCT VFR BLD AUTO: 36.8 % (ref 41–52)
HGB BLD-MCNC: 11.7 G/DL (ref 13.5–17.5)
MCH RBC QN AUTO: 31 PG (ref 26–34)
MCHC RBC AUTO-ENTMCNC: 31.8 G/DL (ref 32–36)
MCV RBC AUTO: 98 FL (ref 80–100)
NRBC BLD-RTO: 0 /100 WBCS (ref 0–0)
P AXIS: 53 DEGREES
PLATELET # BLD AUTO: 216 X10*3/UL (ref 150–450)
POTASSIUM SERPL-SCNC: 4.3 MMOL/L (ref 3.4–5.1)
PR INTERVAL: 304 MS
Q ONSET: 225 MS
QRS COUNT: 10 BEATS
QRS DURATION: 142 MS
QT INTERVAL: 462 MS
QTC CALCULATION(BAZETT): 457 MS
QTC FREDERICIA: 459 MS
R AXIS: -54 DEGREES
RBC # BLD AUTO: 3.77 X10*6/UL (ref 4.5–5.9)
SODIUM SERPL-SCNC: 137 MMOL/L (ref 133–145)
T AXIS: 37 DEGREES
T OFFSET: 456 MS
TROPONIN T SERPL-MCNC: 22 NG/L
VENTRICULAR RATE: 59 BPM
WBC # BLD AUTO: 4.3 X10*3/UL (ref 4.4–11.3)

## 2024-05-17 PROCEDURE — 2500000001 HC RX 250 WO HCPCS SELF ADMINISTERED DRUGS (ALT 637 FOR MEDICARE OP)

## 2024-05-17 PROCEDURE — 93248 EXT ECG>7D<15D REV&INTERPJ: CPT | Performed by: INTERNAL MEDICINE

## 2024-05-17 PROCEDURE — 2500000001 HC RX 250 WO HCPCS SELF ADMINISTERED DRUGS (ALT 637 FOR MEDICARE OP): Performed by: EMERGENCY MEDICINE

## 2024-05-17 PROCEDURE — 80048 BASIC METABOLIC PNL TOTAL CA: CPT

## 2024-05-17 PROCEDURE — 93246 EXT ECG>7D<15D RECORDING: CPT

## 2024-05-17 PROCEDURE — 99231 SBSQ HOSP IP/OBS SF/LOW 25: CPT | Performed by: NURSE PRACTITIONER

## 2024-05-17 PROCEDURE — 85027 COMPLETE CBC AUTOMATED: CPT

## 2024-05-17 PROCEDURE — 84484 ASSAY OF TROPONIN QUANT: CPT | Performed by: EMERGENCY MEDICINE

## 2024-05-17 PROCEDURE — G0378 HOSPITAL OBSERVATION PER HR: HCPCS

## 2024-05-17 PROCEDURE — 36415 COLL VENOUS BLD VENIPUNCTURE: CPT

## 2024-05-17 RX ADMIN — Medication 100 MG: at 13:26

## 2024-05-17 RX ADMIN — Medication 100 MG: at 09:03

## 2024-05-17 RX ADMIN — APIXABAN 2.5 MG: 2.5 TABLET, FILM COATED ORAL at 09:01

## 2024-05-17 RX ADMIN — CALCIUM 1250 MG: 500 TABLET ORAL at 09:01

## 2024-05-17 RX ADMIN — ASPIRIN 81 MG: 81 TABLET, CHEWABLE ORAL at 09:02

## 2024-05-17 RX ADMIN — CARVEDILOL 6.25 MG: 6.25 TABLET, FILM COATED ORAL at 09:03

## 2024-05-17 RX ADMIN — GABAPENTIN 300 MG: 300 CAPSULE ORAL at 09:02

## 2024-05-17 RX ADMIN — MULTIVITAMIN TABLET 1 TABLET: TABLET at 09:02

## 2024-05-17 ASSESSMENT — COGNITIVE AND FUNCTIONAL STATUS - GENERAL
DAILY ACTIVITIY SCORE: 24
MOBILITY SCORE: 24

## 2024-05-17 ASSESSMENT — ACTIVITIES OF DAILY LIVING (ADL): LACK_OF_TRANSPORTATION: NO

## 2024-05-17 ASSESSMENT — PAIN SCALES - GENERAL: PAINLEVEL_OUTOF10: 0 - NO PAIN

## 2024-05-17 NOTE — DISCHARGE SUMMARY
Discharge Diagnosis  Syncope, unspecified syncope type    Issues Requiring Follow-Up  Zio patch x 14 days    Test Results Pending At Discharge  Pending Labs       No current pending labs.           History Of Present Illness  Ciarra Beckett is a 73 y.o. male past medical history of hypertension, CAD, pulmonary embolism, and alcohol abuse presenting to emergency department for evaluation after he was found unresponsive and leaning down on his electric scooter today at [facility] with his son.  Patient states he does not recall falling or losing consciousness.  He denies hitting his head or any injuries.  He denies chest pain, shortness of breath, palpitations, diaphoresis, and weakness or numbness.  He denies abdominal discomfort, nausea/vomiting/diarrhea.  Wife Luis reported that he had similar symptoms last Sunday and admitted to [facility].  Wife states his cardiac stress test was normal.  She was not sure about echocardiogram.  On physical exam he is well-nourished, well-developed male not in acute distress.  No neurological or vascular deficits on exam.  He is alert and oriented x 4. He is a smoker smokes cigarettes occasionally.  He drinks alcohol.  He is negative for CIWA.     His electronic medical records reviewed and shows that he was admitted at [facility] on May 12, 2024 for syncope.  His cardiac and neurology evaluation were normal.  Cardiology and neurology cleared him for discharge.  His cardiac stress test and echocardiogram was completed and was normal during the visit at [facility] on 5/14/2024.     He follows his primary care doctor L[facility].     ED course  EKG with sinus rhythm at 71 bpm, first-degree AV block, normal axis, normal voltage, normal ST segment, normal T waves  Repeat EKG with sinus bradycardia at 59 bpm, first-degree block, leftward axis, right bundle branch block pattern, normal ST segment, normal T waves  Diagnostic labs with evidence of substance  abuse, borderline lactic acidosis, elevated troponin T, evidence of recent alcohol ingestion, leukopenia, anemia, but otherwise unremarkable.   High-sensitivity troponin 30; 26  Chest x-ray shows Elevated right diaphragm. No focal infiltrate.   CT head shows No CT evidence for acute intracranial pathology.   CT cervical spine shows  No acute fracture or spondylolisthesis.   2. Degenerative disc disease and spondylosis as described in the body   of the report.     Hospital Course  He was admitted for overnight observation. No events overnight. HScTns were slightly elevated but flat. EKG does show sinus rhythm with a first-degree AV block as well as a right bundle branch block. He is asymptomatic this morning and is stable for discharge. A Zio patch x 14 days will be placed before discharge. He will follow up with Dr. Vitale in 4-6 weeks.     Pertinent Physical Exam At Time of Discharge  Physical Exam  Vitals and nursing note reviewed.   Constitutional:       General: He is not in acute distress.     Appearance: Normal appearance. He is not ill-appearing, toxic-appearing or diaphoretic.   HENT:      Head: Normocephalic and atraumatic.      Nose: Nose normal.      Mouth/Throat:      Mouth: Mucous membranes are moist.   Eyes:      Extraocular Movements: Extraocular movements intact.   Cardiovascular:      Rate and Rhythm: Normal rate and regular rhythm.      Pulses: Normal pulses.      Heart sounds: Normal heart sounds.   Pulmonary:      Effort: Pulmonary effort is normal.      Breath sounds: Normal breath sounds.   Abdominal:      General: Abdomen is flat. Bowel sounds are normal. There is no distension.      Palpations: Abdomen is soft. There is no mass.      Tenderness: There is no abdominal tenderness. There is no guarding or rebound.   Musculoskeletal:         General: Normal range of motion.      Cervical back: Normal range of motion.      Right lower leg: No edema.      Left lower leg: No edema.   Skin:      General: Skin is warm and dry.      Capillary Refill: Capillary refill takes less than 2 seconds.   Neurological:      General: No focal deficit present.      Mental Status: He is alert and oriented to person, place, and time.   Psychiatric:         Behavior: Behavior normal.         Home Medications     Medication List      CONTINUE taking these medications     apixaban 5 mg tablet; Commonly known as: Eliquis   atorvastatin 40 mg tablet; Commonly known as: Lipitor   calcium carbonate 500 mg calcium (1,250 mg) tablet; Commonly known as:   Oscal   carvedilol 6.25 mg tablet; Commonly known as: Coreg   cholecalciferol 50 MCG (2000 UT) tablet; Commonly known as: Vitamin D-3   gabapentin 300 mg capsule; Commonly known as: Neurontin   multivitamin tablet   tamsulosin 0.4 mg 24 hr capsule; Commonly known as: Flomax   thiamine 100 mg tablet; Commonly known as: Vitamin B-1       Outpatient Follow-Up  No future appointments.    Vidya Reese, APRN-CNP

## 2024-05-17 NOTE — PROGRESS NOTES
"Ciarra Beckett is a 73 y.o. male on day 0 of admission presenting with Syncope, unspecified syncope type.    Subjective   States he feels well this morning.  No shortness of breath no dizziness no chest discomfort no shortness of breath.       Objective     Physical Exam  Eyes:      Conjunctiva/sclera: Conjunctivae normal.   Cardiovascular:      Rate and Rhythm: Normal rate and regular rhythm.      Heart sounds:      No gallop.   Pulmonary:      Breath sounds: Normal breath sounds. No wheezing, rhonchi or rales.   Abdominal:      Palpations: Abdomen is soft.   Neurological:      General: No focal deficit present.      Mental Status: He is alert.       Constitutional:       Appearance: Not in distress.   Eyes:      Conjunctiva/sclera: Conjunctivae normal.   Neck:      Vascular: JVD normal.   Pulmonary:      Breath sounds: Normal breath sounds. No wheezing. No rhonchi. No rales.   Cardiovascular:      Normal rate. Regular rhythm.      Murmurs: There is no murmur.      No gallop.  No click. No rub.   Abdominal:      Palpations: Abdomen is soft.   Neurological:      General: No focal deficit present.      Mental Status: Alert.        Last Recorded Vitals  Blood pressure 149/83, pulse 65, temperature 36.7 °C (98.1 °F), temperature source Oral, resp. rate 17, height 1.803 m (5' 11\"), weight 86.2 kg (190 lb), SpO2 97%.  Intake/Output last 3 Shifts:  I/O last 3 completed shifts:  In: 1000 (11.6 mL/kg) [IV Piggyback:1000]  Out: - (0 mL/kg)   Weight: 86.2 kg     Relevant Results                  Results for orders placed or performed during the hospital encounter of 05/16/24 (from the past 24 hour(s))   ECG 12 lead   Result Value Ref Range    Ventricular Rate 71 BPM    Atrial Rate 71 BPM    NM Interval 276 ms    QRS Duration 154 ms    QT Interval 432 ms    QTC Calculation(Bazett) 469 ms    P Axis 55 degrees    R Axis 265 degrees    T Axis 46 degrees    QRS Count 11 beats    Q Onset 226 ms    P Onset 88 ms    P Offset 158 ms "    T Offset 442 ms    QTC Fredericia 457 ms   NT Pro-BNP   Result Value Ref Range    PROBNP 160 0 - 229 pg/mL   CBC and Auto Differential   Result Value Ref Range    WBC 3.9 (L) 4.4 - 11.3 x10*3/uL    nRBC 0.0 0.0 - 0.0 /100 WBCs    RBC 3.75 (L) 4.50 - 5.90 x10*6/uL    Hemoglobin 11.6 (L) 13.5 - 17.5 g/dL    Hematocrit 36.7 (L) 41.0 - 52.0 %    MCV 98 80 - 100 fL    MCH 30.9 26.0 - 34.0 pg    MCHC 31.6 (L) 32.0 - 36.0 g/dL    RDW 15.8 (H) 11.5 - 14.5 %    Platelets 202 150 - 450 x10*3/uL    Neutrophils % 58.6 40.0 - 80.0 %    Immature Granulocytes %, Automated 1.0 (H) 0.0 - 0.9 %    Lymphocytes % 26.7 13.0 - 44.0 %    Monocytes % 10.8 2.0 - 10.0 %    Eosinophils % 2.1 0.0 - 6.0 %    Basophils % 0.8 0.0 - 2.0 %    Neutrophils Absolute 2.28 1.60 - 5.50 x10*3/uL    Immature Granulocytes Absolute, Automated 0.04 0.00 - 0.50 x10*3/uL    Lymphocytes Absolute 1.04 0.80 - 3.00 x10*3/uL    Monocytes Absolute 0.42 0.05 - 0.80 x10*3/uL    Eosinophils Absolute 0.08 0.00 - 0.40 x10*3/uL    Basophils Absolute 0.03 0.00 - 0.10 x10*3/uL   Comprehensive metabolic panel   Result Value Ref Range    Glucose 139 (H) 65 - 99 mg/dL    Sodium 137 133 - 145 mmol/L    Potassium 3.4 3.4 - 5.1 mmol/L    Chloride 100 97 - 107 mmol/L    Bicarbonate 27 24 - 31 mmol/L    Urea Nitrogen 11 8 - 25 mg/dL    Creatinine 0.90 0.40 - 1.60 mg/dL    eGFR 90 >60 mL/min/1.73m*2    Calcium 9.2 8.5 - 10.4 mg/dL    Albumin 3.5 3.5 - 5.0 g/dL    Alkaline Phosphatase 79 35 - 125 U/L    Total Protein 6.5 5.9 - 7.9 g/dL    AST 27 5 - 40 U/L    Bilirubin, Total 0.3 0.1 - 1.2 mg/dL    ALT 18 5 - 40 U/L    Anion Gap 10 <=19 mmol/L   BLOOD GAS LACTIC ACID, VENOUS   Result Value Ref Range    POCT Lactate, Venous 2.2 (H) 0.4 - 2.0 mmol/L   Serial Troponin, Initial ([facility])   Result Value Ref Range    Troponin T, High Sensitivity 30 ([facility]) <=14 ng/L   Ethanol   Result Value Ref Range    Alcohol 0.092 (H) 0.000 - 0.010 g/dL   Urinalysis with Reflex Culture and Microscopic    Result Value Ref Range    Color, Urine Colorless (N) Light-Yellow, Yellow, Dark-Yellow    Appearance, Urine Clear Clear    Specific Gravity, Urine 1.005 1.005 - 1.035    pH, Urine 6.5 5.0, 5.5, 6.0, 6.5, 7.0, 7.5, 8.0    Protein, Urine NEGATIVE NEGATIVE, 10 (TRACE), 20 (TRACE) mg/dL    Glucose, Urine Normal Normal mg/dL    Blood, Urine NEGATIVE NEGATIVE    Ketones, Urine NEGATIVE NEGATIVE mg/dL    Bilirubin, Urine NEGATIVE NEGATIVE    Urobilinogen, Urine Normal Normal mg/dL    Nitrite, Urine NEGATIVE NEGATIVE    Leukocyte Esterase, Urine NEGATIVE NEGATIVE   Extra Urine Gray Tube   Result Value Ref Range    Extra Tube Hold for add-ons.    Drug Screen, Urine   Result Value Ref Range    Amphetamine Screen, Urine Negative      Barbiturate Screen, Urine Negative      Benzodiazepines Screen, Urine Negative      Cannabinoid Screen, Urine Positive ([facility])      Cocaine Metabolite Screen, Urine Negative      Fentanyl Screen, Urine Negative       Methadone Screen, Urine Negative      Opiate Screen, Urine Negative      Oxycodone Screen, Urine Negative      PCP Screen, Urine Negative     Blood Gas Lactic Acid, Venous   Result Value Ref Range    POCT Lactate, Venous 2.1 (H) 0.4 - 2.0 mmol/L   Serial Troponin, 2 Hour ([facility])   Result Value Ref Range    Troponin T, High Sensitivity 26 (HH) <=14 ng/L   ECG 12 lead   Result Value Ref Range    Ventricular Rate 59 BPM    Atrial Rate 59 BPM    AR Interval 304 ms    QRS Duration 142 ms    QT Interval 462 ms    QTC Calculation(Bazett) 457 ms    P Axis 53 degrees    R Axis -54 degrees    T Axis 37 degrees    QRS Count 10 beats    Q Onset 225 ms    T Offset 456 ms    QTC Fredericia 459 ms   Serial Troponin, 6 Hour ([facility])   Result Value Ref Range    Troponin T, High Sensitivity 22 (HH) <=14 ng/L   CBC   Result Value Ref Range    WBC 4.3 (L) 4.4 - 11.3 x10*3/uL    nRBC 0.0 0.0 - 0.0 /100 WBCs    RBC 3.77 (L) 4.50 - 5.90 x10*6/uL    Hemoglobin 11.7 (L) 13.5 - 17.5 g/dL    Hematocrit 36.8 (L) 41.0  - 52.0 %    MCV 98 80 - 100 fL    MCH 31.0 26.0 - 34.0 pg    MCHC 31.8 (L) 32.0 - 36.0 g/dL    RDW 15.7 (H) 11.5 - 14.5 %    Platelets 216 150 - 450 x10*3/uL   Basic metabolic panel   Result Value Ref Range    Glucose 112 (H) 65 - 99 mg/dL    Sodium 137 133 - 145 mmol/L    Potassium 4.3 3.4 - 5.1 mmol/L    Chloride 101 97 - 107 mmol/L    Bicarbonate 27 24 - 31 mmol/L    Urea Nitrogen 10 8 - 25 mg/dL    Creatinine 0.70 0.40 - 1.60 mg/dL    eGFR >90 >60 mL/min/1.73m*2    Calcium 9.5 8.5 - 10.4 mg/dL    Anion Gap 9 <=19 mmol/L               Assessment/Plan   Principal Problem:    Syncope, unspecified syncope type    Syncope  Essential hypertension  Hyperlipidemia  Alcohol use    5/17: Patient has been stable overnight.  Opponents are slightly elevated but in a flat pattern not suggestive of an acute coronary syndrome.  Twelve-lead EKG does have a sinus rhythm with a first-degree AV block as well as a right bundle branch block.  Thus bradycardia arrhythmia still need to be in the differential diagnosis as a possibility.  If patient remains stable okay discharged home with a patch monitor for 14 days.  Certainly alcohol use could also play a role in the episodes as well.       I spent 18 minutes in the professional and overall care of this patient.      Renato Vitale, DO

## 2024-05-17 NOTE — PROGRESS NOTES
Dc order is in and pt is going home with no identified skilled needs.      05/17/24 0922   Discharge Planning   Living Arrangements Spouse/significant other   Support Systems Spouse/significant other   Assistance Needed none   Type of Residence Private residence   Number of Stairs to Enter Residence 3   Number of Stairs Within Residence 0   Do you have animals or pets at home? Yes   Type of Animals or Pets dog   Home or Post Acute Services None   Patient expects to be discharged to: Home   Does the patient need discharge transport arranged? No   Financial Resource Strain   How hard is it for you to pay for the very basics like food, housing, medical care, and heating? Not hard   Housing Stability   In the last 12 months, was there a time when you were not able to pay the mortgage or rent on time? N   In the last 12 months, how many places have you lived? 1   In the last 12 months, was there a time when you did not have a steady place to sleep or slept in a shelter (including now)? N   Transportation Needs   In the past 12 months, has lack of transportation kept you from medical appointments or from getting medications? no   In the past 12 months, has lack of transportation kept you from meetings, work, or from getting things needed for daily living? No   Patient Choice   Patient / Family choosing to utilize agency / facility established prior to hospitalization No

## 2024-05-17 NOTE — CONSULTS
"Nutrition Assessement Note    Nutrition Assessment    Reason for Assessment: Admission nursing screening (MST 3)    Pt w/ active discharge order. Per chart, there is no weight loss, but weight gain. Pt has a hx of hypotension, probable cause of syncope episode. No other nutrition related concerns.     Reason for Hospital Admission:  Ciarra Beckett is a 73 y.o. male who is admitted for syncope.     Past Medical History:   Diagnosis Date    H/O ETOH abuse     Hyperlipidemia     Hypertension     Pulmonary embolism (Multi)       Past Surgical History:   Procedure Laterality Date    HIP FRACTURE SURGERY      2009       Nutrition History:  Food and Nutrient History: N/a     Food Allergies/Intolerances:  None      Anthropometrics:  Ht: 180.3 cm (5' 11\"), Wt: 86.2 kg (190 lb), BMI: 26.51  IBW/kg (Dietitian Calculated): 78.18 kg          Weight Change:  Daily Weight  05/16/24 : 86.2 kg (190 lb)     Per chart review:  10/20/23: 161 lbs  3/28/23: 167 lbs    Weight History / % Weight Change: Per chart, pt has gained 29# (18%) over 7 months             Nutrition Focused Physical Exam Findings:             Edema  Edema: +1 trace  Edema Location: BLE         Nutrition Significant Labs:  Lab Results   Component Value Date    WBC 4.3 (L) 05/17/2024    HGB 11.7 (L) 05/17/2024    HCT 36.8 (L) 05/17/2024     05/17/2024    ALT 18 05/16/2024    AST 27 05/16/2024     05/17/2024    K 4.3 05/17/2024     05/17/2024    CREATININE 0.70 05/17/2024    BUN 10 05/17/2024    CO2 27 05/17/2024     Nutrition Specific Medications:  apixaban, 2.5 mg, oral, BID  aspirin, 81 mg, oral, Daily  atorvastatin, 40 mg, oral, Nightly  calcium carbonate, 1,250 mg, oral, Daily  carvedilol, 6.25 mg, oral, BID  gabapentin, 300 mg, oral, BID  multivitamin, 1 tablet, oral, Daily  perflutren lipid microspheres, 0.5-10 mL of dilution, intravenous, Once in imaging  perflutren protein A microsphere, 0.5 mL, intravenous, Once in imaging  sulfur " hexafluoride microsphr, 2 mL, intravenous, Once in imaging  thiamine, 100 mg, oral, TID after meals           Dietary Orders (From admission, onward)       Start     Ordered    05/17/24 0730  Adult diet Regular  Diet effective now        Question:  Diet type  Answer:  Regular    05/17/24 0729                     Estimated Needs:   Estimated Energy Needs  Total Energy Estimated Needs (kCal): 2155 kCal  Total Estimated Energy Need per Day (kCal/kg): 25 kCal/kg  Method for Estimating Needs: actual wt    Estimated Protein Needs  Total Protein Estimated Needs (g):  ()  Total Protein Estimated Needs (g/kg):  (1-1.2)  Method for Estimating Needs: actual wt    Estimated Fluid Needs  Total Fluid Estimated Needs (mL): 2155 mL  Method for Estimating Needs: 1 mL/kcal        Nutrition Diagnosis   Nutrition Diagnosis:       Nutrition Diagnosis  Patient has Nutrition Diagnosis: No       Nutrition Interventions/Recommendations   Nutrition Interventions and Recommendations:    Nutrition Prescription:  Individualized Nutrition Prescription Provided for : 2155 kcals,  gm protein via diet    Nutrition Interventions:   Food and/or Nutrient Delivery Interventions  Interventions: Meals and snacks  Meals and Snacks: General healthful diet  Goal: provide diet as ordered    Education Documentation  No documentation found.           Nutrition Monitoring and Evaluation   Monitoring/Evaluation:   Food/Nutrient Related History Monitoring  Monitoring and Evaluation Plan: Energy intake  Energy Intake: Estimated energy intake  Criteria: pt to consume >/= 75% estimated needs            Time Spent/Follow-up:   Follow Up  Time Spent (min): 20 minutes  Last Date of Nutrition Visit: 05/17/24  Nutrition Follow-Up Needed?: 7-10 days  Follow up Comment: 5/24/24

## 2024-05-17 NOTE — NURSING NOTE
Pt admitted to room 419 from ed, alert and oriented, wife at bedside, vss, 1st degree av block on tele, asymptomatic, hep loc left wrist patent, oriented to room and call light system

## 2024-05-17 NOTE — CARE PLAN
The patient's goals for the shift include      The clinical goals for the shift include remain hemodynamically stable    Over the shift, the patient did make progress toward the following goals.   Problem: Pain  Goal: My pain/discomfort is manageable  Outcome: Progressing     Problem: Safety  Goal: Patient will be injury free during hospitalization  Outcome: Progressing     Problem: Safety  Goal: I will remain free of falls  Outcome: Progressing     Problem: Daily Care  Goal: Daily care needs are met  Outcome: Progressing     Problem: Psychosocial Needs  Goal: Demonstrates ability to cope with hospitalization/illness  Outcome: Progressing

## 2024-05-31 LAB — BODY SURFACE AREA: 2.08 M2

## 2024-07-10 PROBLEM — E78.2 MIXED HYPERLIPIDEMIA: Status: ACTIVE | Noted: 2024-07-10

## 2024-07-10 PROBLEM — I10 PRIMARY HYPERTENSION: Status: ACTIVE | Noted: 2024-07-10

## 2024-08-14 LAB — BODY SURFACE AREA: 2.08 M2
